# Patient Record
Sex: FEMALE | Race: WHITE | Employment: OTHER | URBAN - METROPOLITAN AREA
[De-identification: names, ages, dates, MRNs, and addresses within clinical notes are randomized per-mention and may not be internally consistent; named-entity substitution may affect disease eponyms.]

---

## 2019-12-26 ENCOUNTER — COMMUNICATION - HEALTHEAST (OUTPATIENT)
Dept: HEALTH INFORMATION MANAGEMENT | Facility: CLINIC | Age: 64
End: 2019-12-26

## 2020-12-10 ENCOUNTER — HOSPITAL ENCOUNTER (OUTPATIENT)
Dept: CARDIOLOGY | Facility: CLINIC | Age: 65
Discharge: HOME | End: 2020-12-10
Admitting: INTERNAL MEDICINE
Payer: COMMERCIAL

## 2020-12-10 DIAGNOSIS — E78.00 HIGH CHOLESTEROL: ICD-10-CM

## 2020-12-10 DIAGNOSIS — Z82.49 FAMILY HISTORY OF HEART DISEASE: ICD-10-CM

## 2020-12-10 PROCEDURE — 75571 CT HRT W/O DYE W/CA TEST: CPT

## 2020-12-10 PROCEDURE — 75571 CT HRT W/O DYE W/CA TEST: CPT | Mod: 26 | Performed by: INTERNAL MEDICINE

## 2021-03-14 ENCOUNTER — HEALTH MAINTENANCE LETTER (OUTPATIENT)
Age: 66
End: 2021-03-14

## 2021-06-20 NOTE — LETTER
Letter by Shea Ruiz at      Author: Shea Ruiz Service: -- Author Type: --    Filed:  Encounter Date: 2019 Status: Signed         2019       Ani Lupe  [id number] [place] [id number]    Dear Ani Andrade:    We are pleased to provide you with secure, online access to medical information for you and your family. Per your request, we have expanded your account to allow access to the records of the following family members:              Brendan DANIELS Lupe (privilege ends on 2024.)     How Do I Login?  1. In your Internet browser, go to [email or web address].  2. Click on Sign Up Now   3. Enter your [employer] Activation Code exactly as it appears below. This code will  60 days after it is generated. You will not need to use this code after you have completed the sign-up process. If you do not sign up before the expiration date, you must request a new code.     [employer] Activation Code: [email or web address]  Expires: 2020 11:22 AM    4. Enter in your date of birth and zip code.  5. Create a [employer] username. Think of one that is secure and easy to remember.  Your username must be between 6 and 20 characters.  6. Create a [employer] password. You can change your password at any time. Your password must be between 8 and 45 characters, contain at least two letters and one number, and contain both upper and lower case letters.  7. Choose a security question, enter your answer, and click Next. This can be used to access [employer] if you forget your password.   8. Enter a valid e-mail address to receive e-mail notifications when new information is available in [employer].  9. Click Sign In.        How Do I Access a Family Member's Account?  10. Select the account you want to access by clicking the [place] with the appropriate patient's name at the top of your screen.   11. You will see a disclaimer page letting you know that you will be viewing a  family member's record. Review the disclaimer and then click Accept Proxy Access Disclaimer to proceed.  12. Once you switch to viewing a family member's record, you can navigate to [employer] pages the same way you would for yourself. You can return to your own account by clicking the [place] at the top of the screen with your name on it.    13. To customize colors and names of the linked accounts, you can select Personalize from the Profile dropdown menu at the top of the screen, then click the Edit button to make changes.      Additional Information  If you have questions, you can e-mail [email or web address] or call [phone] to talk to our [facility] [employer] staff. Remember, [employer] is NOT to be used for urgent needs. For medical emergencies, dial 911.

## 2021-10-24 ENCOUNTER — HEALTH MAINTENANCE LETTER (OUTPATIENT)
Age: 66
End: 2021-10-24

## 2022-04-10 ENCOUNTER — HEALTH MAINTENANCE LETTER (OUTPATIENT)
Age: 67
End: 2022-04-10

## 2022-10-15 ENCOUNTER — HEALTH MAINTENANCE LETTER (OUTPATIENT)
Age: 67
End: 2022-10-15

## 2023-03-26 ENCOUNTER — HEALTH MAINTENANCE LETTER (OUTPATIENT)
Age: 68
End: 2023-03-26

## 2023-06-01 ENCOUNTER — HEALTH MAINTENANCE LETTER (OUTPATIENT)
Age: 68
End: 2023-06-01